# Patient Record
Sex: MALE | Race: WHITE | NOT HISPANIC OR LATINO | Employment: FULL TIME | URBAN - NONMETROPOLITAN AREA
[De-identification: names, ages, dates, MRNs, and addresses within clinical notes are randomized per-mention and may not be internally consistent; named-entity substitution may affect disease eponyms.]

---

## 2023-08-29 ENCOUNTER — OFFICE VISIT (OUTPATIENT)
Dept: CARDIOLOGY | Facility: CLINIC | Age: 38
End: 2023-08-29
Payer: COMMERCIAL

## 2023-08-29 VITALS
RESPIRATION RATE: 18 BRPM | DIASTOLIC BLOOD PRESSURE: 76 MMHG | BODY MASS INDEX: 27.4 KG/M2 | HEIGHT: 69 IN | HEART RATE: 76 BPM | OXYGEN SATURATION: 99 % | SYSTOLIC BLOOD PRESSURE: 135 MMHG | WEIGHT: 185 LBS

## 2023-08-29 DIAGNOSIS — R94.31 ABNORMAL FINDING ON EKG: ICD-10-CM

## 2023-08-29 DIAGNOSIS — Z78.9 ALCOHOL USE: ICD-10-CM

## 2023-08-29 DIAGNOSIS — Z91.89 AT RISK FOR SLEEP APNEA: ICD-10-CM

## 2023-08-29 DIAGNOSIS — Z72.0 TOBACCO USE: ICD-10-CM

## 2023-08-29 DIAGNOSIS — I10 ESSENTIAL HYPERTENSION: Primary | ICD-10-CM

## 2023-08-29 PROBLEM — F10.90 ALCOHOL USE: Status: ACTIVE | Noted: 2023-08-29

## 2023-08-29 PROBLEM — K76.0 HEPATIC STEATOSIS: Status: ACTIVE | Noted: 2023-08-29

## 2023-08-29 PROCEDURE — 93000 ELECTROCARDIOGRAM COMPLETE: CPT

## 2023-08-29 PROCEDURE — 99204 OFFICE O/P NEW MOD 45 MIN: CPT

## 2023-08-29 RX ORDER — LISINOPRIL AND HYDROCHLOROTHIAZIDE 20; 12.5 MG/1; MG/1
2 TABLET ORAL DAILY
COMMUNITY
Start: 2023-08-23

## 2023-08-29 NOTE — PROGRESS NOTES
[facility] [place]  [facility] CARDIOLOGY  [address] [place] [address]  Dept: [phone]  Dept Fax: [phone]    Tashi Rodriguez  1985    New Patient Office Note    History of Present Illness:  Tashi Rodriguez is a 38 y.o. male who presents to the clinic for Establish Care.  He has PMH significant for hepatic steatosis, B12 deficiency, HTN.  He is .  Current nightly drinker 4-12 drinks per night for 15 years, also current every day smoker half pack per day x20 years.  Caffeine drinks daily.  Family history includes mother-HTN.  Father-HTN, DM.    He presents today as referral from PCP evaluation of recently uncontrolled HTN.  He had a ED visit 7- for elevated BP greater than 200 systolic.  He states to have known diagnosis of HTN since he was in high school, he was given medication but had stopped taking, states poor compliance.  Recently he was restarted on Zestoretic 40-25 daily, BP today has improved.  He denies significant complaints at today's visit, no CP, SOB, edema, orthopnea, PND.  Recent labs reviewed revealing elevated liver enzymes, negative UDS, proteinuria, otherwise unremarkable.  ECG today SR, HR 73 bpm, left axis, nonspecific ST abnormalities, suggestive of LVH.  He did have recent visit with ENT, high risk LICO, positive snoring and witnessed apneic episodes.  He states he is unlikely to get sleep evaluation, is considering currently.  Exam today reveals normal /80 bilaterally, otherwise unremarkable.  We did discuss nutritional guidance today, advised smoking cessation, also to decrease significantly alcohol use, stop NSAIDs which he states he has been taking daily for a long time.  At this moment given his long history high blood pressure, abnormal ECG finding we will proceed with echo.    The following portions of the patient's history were reviewed and updated as appropriate: allergies, current medications, past family history, past  "medical history, past social history, past surgical history, and problem list.    Medications:  lisinopril-hydrochlorothiazide    Subjective  No Known Allergies     History reviewed. No pertinent past medical history.    History reviewed. No pertinent surgical history.    History reviewed. No pertinent family history.     Social History     Socioeconomic History    Marital status:    Tobacco Use    Smoking status: Every Day     Packs/day: 0.25     Types: Cigarettes    Smokeless tobacco: Current   Vaping Use    Vaping Use: Never used   Substance and Sexual Activity    Alcohol use: Yes    Drug use: Never    Sexual activity: Defer       Review of Systems   All other systems reviewed and are negative.    Cardiovascular Procedures    ECHO/MUGA:   STRESS TESTS:   CARDIAC CATH:   DEVICES:   HOLTER:   CT/MRI:   VASCULAR:   CARDIOTHORACIC:     Objective  Vitals:    08/29/23 1337   BP: 135/76   BP Location: Right arm   Patient Position: Lying   Cuff Size: Adult   Pulse: 76   Resp: 18   SpO2: 99%   Weight: 83.9 kg (185 lb)   Height: 175.3 cm (69\")   PainSc: 0-No pain       Physical Exam  Vitals reviewed.   Constitutional:       General: Awake.      Appearance: Normal and healthy appearance. Not in distress.   Neck:      Vascular: No JVR. JVD normal.   Pulmonary:      Effort: Pulmonary effort is normal.      Breath sounds: Normal breath sounds. No wheezing. No rhonchi. No rales.   Chest:      Chest wall: Not tender to palpatation.   Cardiovascular:      PMI at left midclavicular line. Normal rate. Regular rhythm. Normal S1. Normal S2.       Murmurs: There is no murmur.      No gallop.  No click. No rub.   Pulses:     Intact distal pulses.   Edema:     Peripheral edema absent.   Abdominal:      General: Bowel sounds are normal.      Palpations: Abdomen is soft.      Tenderness: There is no abdominal tenderness.   Musculoskeletal: Normal range of motion.         General: No tenderness. Skin:     General: Skin is " warm and dry.   Neurological:      General: No focal deficit present.      Mental Status: Alert and oriented to person, place and time.   Psychiatric:         Behavior: Behavior is cooperative.        Diagnostic Data    ECG 12 Lead    Date/Time: 8/29/2023 2:39 PM  Performed by: Nicole Arteaga APRN  Authorized by: Nicole Arteaga APRN   Comparison: compared with previous ECG from 7/10/2023  Similar to previous ECG  Rhythm: sinus rhythm  Rate: normal  BPM: 73  QRS axis: left  Other findings: non-specific ST-T wave changes and left ventricular hypertrophy    Clinical impression: abnormal EKG      Advance Care Planning        Assessment and Plan  Diagnoses and all orders for this visit:    1. Essential hypertension (Primary)  Controlled today on Zestoretic 40-25, /80 right, 126/84 left.  At this time advised low-salt, stop naproxen, smoking cessation, alcohol reduction with goal for cessation.  Continue current management this time.  We will get echo as ECG suggest LVH.  -     Adult Transthoracic Echo Complete W/ Cont if Necessary Per Protocol; Future    2. Abnormal finding on EKG  As above, nonspecific ST abnormalities with LVH.  Echo.    3. Tobacco use  Current everyday smoker half PPD x20 years.  Advised cessation.  He is not agreeable at this time.    4. Alcohol use  Current daily use 4-12 drinks at night for 15 years.  Advised to taper down for goal of cessation.    5. At risk for sleep apnea  Saw ENT Dr. Guillermo today.  Dr. Guillermo advised he has enlarged tonsils, also should have sleep study.  At this moment he is not sure if he will have.          Return in about 6 months (around 2/29/2024) for Recheck, Nicole MAYORGA.    MUKESH Guerrero  08/29/2023    Part of this note may be an electronic transcription/translation of spoken language to printed text using the Dragon Dictation System.

## 2024-01-30 ENCOUNTER — TELEPHONE (OUTPATIENT)
Dept: CARDIOLOGY | Facility: CLINIC | Age: 39
End: 2024-01-30
Payer: COMMERCIAL

## 2025-01-08 ENCOUNTER — OFFICE VISIT (OUTPATIENT)
Dept: FAMILY MEDICINE CLINIC | Facility: CLINIC | Age: 40
End: 2025-01-08
Payer: COMMERCIAL

## 2025-01-08 VITALS
DIASTOLIC BLOOD PRESSURE: 78 MMHG | HEIGHT: 69 IN | OXYGEN SATURATION: 100 % | WEIGHT: 186.2 LBS | SYSTOLIC BLOOD PRESSURE: 132 MMHG | BODY MASS INDEX: 27.58 KG/M2 | HEART RATE: 73 BPM

## 2025-01-08 DIAGNOSIS — Z11.59 NEED FOR HEPATITIS C SCREENING TEST: ICD-10-CM

## 2025-01-08 DIAGNOSIS — Z13.1 SCREENING FOR DIABETES MELLITUS: ICD-10-CM

## 2025-01-08 DIAGNOSIS — Z91.89 AT RISK FOR SLEEP APNEA: ICD-10-CM

## 2025-01-08 DIAGNOSIS — I10 ESSENTIAL HYPERTENSION: ICD-10-CM

## 2025-01-08 DIAGNOSIS — Z76.89 ENCOUNTER TO ESTABLISH CARE: Primary | ICD-10-CM

## 2025-01-08 DIAGNOSIS — K21.9 GASTROESOPHAGEAL REFLUX DISEASE, UNSPECIFIED WHETHER ESOPHAGITIS PRESENT: ICD-10-CM

## 2025-01-08 DIAGNOSIS — Z13.220 LIPID SCREENING: ICD-10-CM

## 2025-01-08 DIAGNOSIS — Z13.29 SCREENING FOR THYROID DISORDER: ICD-10-CM

## 2025-01-08 PROCEDURE — 99204 OFFICE O/P NEW MOD 45 MIN: CPT | Performed by: PHYSICIAN ASSISTANT

## 2025-01-08 RX ORDER — FAMOTIDINE 10 MG
10 TABLET ORAL 2 TIMES DAILY
COMMUNITY

## 2025-01-08 RX ORDER — LISINOPRIL AND HYDROCHLOROTHIAZIDE 12.5; 2 MG/1; MG/1
2 TABLET ORAL DAILY
Qty: 180 TABLET | Refills: 1 | Status: SHIPPED | OUTPATIENT
Start: 2025-01-08

## 2025-01-08 NOTE — PROGRESS NOTES
New Patient Office Visit      Date: 2025   Patient Name: Tashi Rodriguez  : 1985   MRN: [id number]     Chief Complaint:    Chief Complaint   Patient presents with    [facility]    Hypertension       Subjective    Patient or patient representative verbalized consent for the use of Ambient Listening during the visit with  Leticia Palmer PA-C for chart documentation. 2025  09:11 EST      History of Present Illness: Tashi Rodriguez is a 39 y.o. male who is here today to establish care.      The patient presents for evaluation of hypertension and health maintenance.  He was seen at [facility], but they are no longer taking his insurance.  He states that he had an annual physical within the last year.    He has been managing his hypertension with 2 tablets daily of lisinopril/hydrochlorothiazide 20/12.5, prescribed by his family practice physician. He believes he has one remaining refill. He does not monitor his blood pressure at home. He has a history of elevated blood pressure dating back to his late teens or early 20s, which he initially ignored until a significant spike prompted a hospital visit. He reports no associated headaches.     He has a history of heavy alcohol consumption for approximately 15 to 16 years but has since ceased drinking. He also has a history of heavy smoking but currently only uses chewing tobacco. He maintains a balanced diet, although he admits to frequent fast food consumption and a preference for red meat. He does not engage in formal exercise but remains active. He has not had recent blood work done. His last annual physical was conducted in , but he is unsure exactly when.    He has consulted with Dr. Shane but has not undergone a sleep study due to personal discomfort with the doctor. He reports poor sleep quality and his wife has observed episodes of breathing difficulties during his sleep, suggesting possible sleep apnea. He has  experienced episodes of waking up gasping for air in the past, but these have not occurred recently. He expresses reluctance towards using a CPAP machine. He typically wakes up around 4:30 AM on workdays and 3:00 AM on his days off. He often dozes off in the evenings before bedtime but remains motivated and active during the day. He has not experienced any weight changes since puberty.    He has a history of gingivitis and requires dental check-ups every 3 months. He brushes his teeth twice daily.    He experiences excessive sweating in his feet but reports no associated complications. He has been advised to keep his feet dry by using powder in his socks and shoes. He prefers wearing boots and dislikes being barefoot. He ensures his feet are clean and dry after work. He has used foot powder in the past but not currently.    SOCIAL HISTORY  The patient has a history of heavy alcohol consumption for approximately 15 to 16 years but has since ceased drinking. He also has a history of heavy smoking but currently only uses chewing tobacco. He works 6 days a week.    IMMUNIZATIONS  The patient has not received an influenza vaccine and is unsure about his tetanus vaccination status.      Review of Systems:   Review of Systems   Constitutional:  Negative for activity change, appetite change, fatigue, fever, unexpected weight gain and unexpected weight loss.   HENT:  Positive for dental problem (Gingivitis, sees dentist every 3 months). Negative for congestion, ear discharge, ear pain, mouth sores, postnasal drip, rhinorrhea, sinus pressure, sneezing, sore throat, trouble swallowing and voice change.    Eyes:  Negative for blurred vision, double vision, photophobia, pain, itching and visual disturbance.   Respiratory:  Negative for apnea, cough, chest tightness, shortness of breath and wheezing.    Cardiovascular:  Negative for chest pain, palpitations and leg swelling.   Gastrointestinal:  Positive for GERD (Managed with  famotidine OTC). Negative for abdominal pain, blood in stool, constipation, diarrhea, nausea and vomiting.   Endocrine: Negative for cold intolerance, heat intolerance, polydipsia and polyuria.   Genitourinary:  Negative for decreased urine volume, difficulty urinating, dysuria, flank pain, frequency, hematuria and urinary incontinence.   Musculoskeletal:  Negative for arthralgias, back pain, gait problem, joint swelling and myalgias.   Skin:  Negative for rash, skin lesions and wound.   Allergic/Immunologic: Negative for environmental allergies and food allergies.   Neurological:  Negative for dizziness, syncope, weakness, light-headedness, numbness and headache.   Hematological:  Negative for adenopathy. Does not bruise/bleed easily.   Psychiatric/Behavioral:  Positive for sleep disturbance. Negative for decreased concentration, dysphoric mood, depressed mood and stress. The patient is not nervous/anxious.        Past Medical History:   Past Medical History:   Diagnosis Date    Anemia     Hypertension        Past Surgical History: History reviewed. No pertinent surgical history.    Family History:   Family History   Problem Relation Age of Onset    Arthritis Mother     Hypertension Mother     Diabetes Father     Heart disease Father         pacemaker    Diabetes Maternal Grandfather     Heart attack Maternal Grandfather     Heart disease Paternal Uncle        Social History:   Social History     Socioeconomic History    Marital status:    Tobacco Use    Smoking status: Former     Current packs/day: 0.25     Average packs/day: 0.3 packs/day for 15.0 years (3.8 ttl pk-yrs)     Types: Cigarettes    Smokeless tobacco: Current     Types: Chew   Vaping Use    Vaping status: Never Used   Substance and Sexual Activity    Alcohol use: Not Currently    Drug use: Never    Sexual activity: Not Currently     Partners: Female       Medications:     Current Outpatient Medications:     lisinopril-hydrochlorothiazide  "(PRINZIDE,ZESTORETIC) 20-12.5 MG per tablet, Take 2 tablets by mouth Daily., Disp: 180 tablet, Rfl: 1    famotidine (PEPCID) 10 MG tablet, Take 1 tablet by mouth 2 (Two) Times a Day. OTC, Disp: , Rfl:     Allergies:   No Known Allergies    Objective     Physical Exam:  Vital Signs:   Vitals:    01/08/25 0833 01/08/25 0957   BP: 142/86 132/78   BP Location: Right arm Left arm   Patient Position: Sitting Sitting   Cuff Size: Adult    Pulse: 73    SpO2: 100%    Weight: 84.5 kg (186 lb 3.2 oz)    Height: 175.3 cm (69\")      Body mass index is 27.5 kg/m².       Physical Exam  Vitals and nursing note reviewed.   Constitutional:       General: He is not in acute distress.     Appearance: Normal appearance.   HENT:      Head: Normocephalic and atraumatic.      Right Ear: Tympanic membrane, ear canal and external ear normal.      Left Ear: Tympanic membrane, ear canal and external ear normal.      Nose: Nose normal.      Mouth/Throat:      Mouth: Mucous membranes are moist.      Pharynx: Oropharynx is clear.   Eyes:      Extraocular Movements: Extraocular movements intact.      Conjunctiva/sclera: Conjunctivae normal.      Pupils: Pupils are equal, round, and reactive to light.   Cardiovascular:      Rate and Rhythm: Normal rate and regular rhythm.      Pulses: Normal pulses.      Heart sounds: Normal heart sounds.   Pulmonary:      Effort: Pulmonary effort is normal.      Breath sounds: Normal breath sounds.   Abdominal:      General: Bowel sounds are normal.      Palpations: Abdomen is soft.   Musculoskeletal:      Cervical back: Normal range of motion and neck supple.      Right lower leg: No edema.      Left lower leg: No edema.   Lymphadenopathy:      Cervical: No cervical adenopathy.   Skin:     General: Skin is warm.   Neurological:      General: No focal deficit present.      Mental Status: He is alert and oriented to person, place, and time.      Cranial Nerves: No cranial nerve deficit.      Motor: No weakness.     "  Coordination: Coordination normal.      Gait: Gait normal.   Psychiatric:         Mood and Affect: Mood normal.         Behavior: Behavior normal.         Results:   PHQ-2 Depression Screening  Little interest or pleasure in doing things? Not at all   Feeling down, depressed, or hopeless? Not at all   PHQ-2 Total Score 0        Labs:    No results found for this or any previous visit (from the past 24 hours).       Assessment / Plan      Assessment/Plan:     Diagnoses and all orders for this visit:    1. Encounter to establish care (Primary)  Comments:  Records will be requested from previous provider.  Orders:  -     HCV Antibody Rfx To Qnt PCR; Future  -     Thyroid Cascade Profile; Future  -     Lipid Panel; Future  -     Hemoglobin A1c; Future  -     Comprehensive Metabolic Panel; Future  -     CBC & Differential  -     Comprehensive Metabolic Panel  -     Hemoglobin A1c  -     Lipid Panel  -     Thyroid Cascade Profile  -     HCV Antibody Rfx To Qnt PCR    2. Essential hypertension  Assessment & Plan:  Hypertension is chronic and stable on current therapy.  He will continue medications as previously prescribed.    Orders:  -     Comprehensive Metabolic Panel; Future  -     CBC & Differential  -     lisinopril-hydrochlorothiazide (PRINZIDE,ZESTORETIC) 20-12.5 MG per tablet; Take 2 tablets by mouth Daily.  Dispense: 180 tablet; Refill: 1  -     Comprehensive Metabolic Panel    3. At risk for sleep apnea  Assessment & Plan:  He reports poor sleep quality and his wife has observed episodes of breathing difficulties during his sleep, suggesting possible sleep apnea. He has been informed about the potential link between sleep apnea and hypertension. He is advised to consider a consultation with a sleep specialist, but he declines.  If he decides to pursue this, he will inform us so that a referral can be arranged.      4. Gastroesophageal reflux disease, unspecified whether esophagitis present  Assessment &  Plan:  He experiences heartburn and acid reflux, which are managed with daily famotidine OTC.      5. Screening for diabetes mellitus  -     Hemoglobin A1c; Future  -     Hemoglobin A1c    6. Lipid screening  -     Lipid Panel; Future  -     Lipid Panel    7. Screening for thyroid disorder  -     Thyroid Cascade Profile; Future  -     Thyroid Dillon Profile    8. Need for hepatitis C screening test  -     HCV Antibody Rfx To Qnt PCR; Future  -     HCV Antibody Rfx To Qnt PCR           Health Maintenance: Discussed injury prevention, diet and exercise, safe sexual practices, and screening for common diseases. Encouraged use of sunscreen and seatbelts. Discussed timing of colon cancer cancer screening, prostate cancer screening, and review of skin for lesions. Avoidance of tobacco encouraged. Limitation or avoidance of alcohol encouraged. Recommend yearly dental and eye exams. Also discussed monitoring of blood pressure and lipids.       Follow Up:   Return in about 6 months (around 7/8/2025) for Annual Physical.    Leticia Palmer PA-C  [facility] Internal Medicine [facility]

## 2025-01-08 NOTE — ASSESSMENT & PLAN NOTE
He reports poor sleep quality and his wife has observed episodes of breathing difficulties during his sleep, suggesting possible sleep apnea. He has been informed about the potential link between sleep apnea and hypertension. He is advised to consider a consultation with a sleep specialist, but he declines.  If he decides to pursue this, he will inform us so that a referral can be arranged.

## 2025-01-09 LAB
ALBUMIN SERPL-MCNC: 5 G/DL (ref 4.1–5.1)
ALP SERPL-CCNC: 70 IU/L (ref 44–121)
ALT SERPL-CCNC: 37 IU/L (ref 0–44)
AST SERPL-CCNC: 28 IU/L (ref 0–40)
BASOPHILS # BLD AUTO: 0.1 X10E3/UL (ref 0–0.2)
BASOPHILS NFR BLD AUTO: 1 %
BILIRUB SERPL-MCNC: 0.5 MG/DL (ref 0–1.2)
BUN SERPL-MCNC: 13 MG/DL (ref 6–20)
BUN/CREAT SERPL: 14 (ref 9–20)
CALCIUM SERPL-MCNC: 9.9 MG/DL (ref 8.7–10.2)
CHLORIDE SERPL-SCNC: 97 MMOL/L (ref 96–106)
CHOLEST SERPL-MCNC: 253 MG/DL (ref 100–199)
CO2 SERPL-SCNC: 24 MMOL/L (ref 20–29)
CREAT SERPL-MCNC: 0.92 MG/DL (ref 0.76–1.27)
EGFRCR SERPLBLD CKD-EPI 2021: 109 ML/MIN/1.73
EOSINOPHIL # BLD AUTO: 0 X10E3/UL (ref 0–0.4)
EOSINOPHIL NFR BLD AUTO: 1 %
ERYTHROCYTE [DISTWIDTH] IN BLOOD BY AUTOMATED COUNT: 12.8 % (ref 11.6–15.4)
GLOBULIN SER CALC-MCNC: 2.5 G/DL (ref 1.5–4.5)
GLUCOSE SERPL-MCNC: 69 MG/DL (ref 70–99)
HBA1C MFR BLD: 5.7 % (ref 4.8–5.6)
HCT VFR BLD AUTO: 45.7 % (ref 37.5–51)
HCV AB SERPL QL IA: NORMAL
HCV IGG SERPL QL IA: NON REACTIVE
HDLC SERPL-MCNC: 49 MG/DL
HGB BLD-MCNC: 15 G/DL (ref 13–17.7)
IMM GRANULOCYTES # BLD AUTO: 0 X10E3/UL (ref 0–0.1)
IMM GRANULOCYTES NFR BLD AUTO: 0 %
LDLC SERPL CALC-MCNC: 167 MG/DL (ref 0–99)
LYMPHOCYTES # BLD AUTO: 1.2 X10E3/UL (ref 0.7–3.1)
LYMPHOCYTES NFR BLD AUTO: 15 %
MCH RBC QN AUTO: 31 PG (ref 26.6–33)
MCHC RBC AUTO-ENTMCNC: 32.8 G/DL (ref 31.5–35.7)
MCV RBC AUTO: 94 FL (ref 79–97)
MONOCYTES # BLD AUTO: 0.7 X10E3/UL (ref 0.1–0.9)
MONOCYTES NFR BLD AUTO: 10 %
NEUTROPHILS # BLD AUTO: 5.5 X10E3/UL (ref 1.4–7)
NEUTROPHILS NFR BLD AUTO: 73 %
PLATELET # BLD AUTO: 275 X10E3/UL (ref 150–450)
POTASSIUM SERPL-SCNC: 4.7 MMOL/L (ref 3.5–5.2)
PROT SERPL-MCNC: 7.5 G/DL (ref 6–8.5)
RBC # BLD AUTO: 4.84 X10E6/UL (ref 4.14–5.8)
SODIUM SERPL-SCNC: 135 MMOL/L (ref 134–144)
TRIGL SERPL-MCNC: 198 MG/DL (ref 0–149)
TSH SERPL DL<=0.005 MIU/L-ACNC: 1.23 UIU/ML (ref 0.45–4.5)
VLDLC SERPL CALC-MCNC: 37 MG/DL (ref 5–40)
WBC # BLD AUTO: 7.5 X10E3/UL (ref 3.4–10.8)

## 2025-07-09 ENCOUNTER — OFFICE VISIT (OUTPATIENT)
Dept: FAMILY MEDICINE CLINIC | Facility: CLINIC | Age: 40
End: 2025-07-09
Payer: COMMERCIAL

## 2025-07-09 VITALS
HEIGHT: 69 IN | WEIGHT: 188.6 LBS | DIASTOLIC BLOOD PRESSURE: 78 MMHG | BODY MASS INDEX: 27.93 KG/M2 | SYSTOLIC BLOOD PRESSURE: 108 MMHG | OXYGEN SATURATION: 96 % | HEART RATE: 81 BPM

## 2025-07-09 DIAGNOSIS — E78.2 MIXED HYPERLIPIDEMIA: ICD-10-CM

## 2025-07-09 DIAGNOSIS — M10.9 GOUT OF LEFT FOOT, UNSPECIFIED CAUSE, UNSPECIFIED CHRONICITY: ICD-10-CM

## 2025-07-09 DIAGNOSIS — K21.9 GASTROESOPHAGEAL REFLUX DISEASE, UNSPECIFIED WHETHER ESOPHAGITIS PRESENT: ICD-10-CM

## 2025-07-09 DIAGNOSIS — Z00.00 GENERAL MEDICAL EXAM: Primary | ICD-10-CM

## 2025-07-09 DIAGNOSIS — I10 ESSENTIAL HYPERTENSION: ICD-10-CM

## 2025-07-09 RX ORDER — VITAMIN B COMPLEX
1 CAPSULE ORAL EVERY 24 HOURS
COMMUNITY

## 2025-07-09 RX ORDER — LISINOPRIL AND HYDROCHLOROTHIAZIDE 12.5; 2 MG/1; MG/1
2 TABLET ORAL DAILY
Qty: 180 TABLET | Refills: 1 | Status: SHIPPED | OUTPATIENT
Start: 2025-07-09

## 2025-07-09 NOTE — PROGRESS NOTES
Male Physical Note      Date: 2025   Patient Name: Tashi Rodriguez  : 1985   MRN: [id number]     Chief Complaint:    Chief Complaint   Patient presents with    Annual Exam       History of Present Illness: Tashi Rodriguez is a 40 y.o. male who is here today for their annual health maintenance and physical.       The patient presents for evaluation of blood pressure, cholesterol, gout, and heartburn.    He has not been monitoring his blood pressure at home but reports no symptoms of lightheadedness or headaches. His diet remains unchanged, with a preference for bread, noodles, cheese, red meat, fish, and rice. He does not engage in regular exercise but maintains an active lifestyle due to his job, which involves being on his feet all day. He has declined the tetanus vaccine.    In early , he experienced a sharp shooting pain in his left foot, diagnosed as gout. The pain was severe, causing difficulty walking and putting on his shoe. A 3-day course of medication alleviated the symptoms. He has had a few flare-ups since then, particularly during cold weather, though none as severe as the initial episode. He seeks advice on managing future flare-ups.    He reports no changes in bowel movements, constipation, or abdominal pain. He manages heartburn and reflux with over-the-counter famotidine and occasionally takes Tums when consuming certain foods like pork rinds.    He has not undergone a sleep study but reports no current sleep issues. He describes himself as a light sleeper, typically waking up at 3:00 AM, and does not feel excessively tired during the day.    FAMILY HISTORY  - Father had gout, particularly in his elbow         Subjective      Review of Systems:   Review of Systems   Constitutional:  Negative for activity change, appetite change, fatigue, fever, unexpected weight gain and unexpected weight loss.   HENT:  Negative for congestion, ear discharge, ear pain, postnasal drip,  rhinorrhea, sinus pressure, sneezing, sore throat, trouble swallowing and voice change.    Eyes:  Negative for blurred vision, double vision, photophobia, pain, itching and visual disturbance.   Respiratory:  Negative for apnea, cough, chest tightness, shortness of breath and wheezing.    Cardiovascular:  Negative for chest pain, palpitations and leg swelling.   Gastrointestinal:  Negative for abdominal pain, blood in stool, constipation, diarrhea, nausea, vomiting and GERD.   Endocrine: Negative for cold intolerance, heat intolerance, polydipsia and polyuria.   Genitourinary:  Negative for decreased urine volume, difficulty urinating, dysuria, flank pain, frequency, hematuria and urinary incontinence.   Musculoskeletal:  Negative for arthralgias, back pain, gait problem, joint swelling and myalgias.   Skin:  Negative for rash, skin lesions and wound.   Allergic/Immunologic: Negative for environmental allergies and food allergies.   Neurological:  Negative for dizziness, syncope, weakness, light-headedness, numbness and headache.   Hematological:  Negative for adenopathy. Does not bruise/bleed easily.   Psychiatric/Behavioral:  Negative for decreased concentration, dysphoric mood, sleep disturbance, depressed mood and stress. The patient is not nervous/anxious.        Past Medical History, Social History, Family History and Care Team were all reviewed with patient and updated as appropriate.     Medications:     Current Outpatient Medications:     B Complex Vitamins (Vitamin B Complex) capsule, 1 capsule Daily., Disp: , Rfl:     famotidine (PEPCID) 10 MG tablet, Take 1 tablet by mouth 2 (Two) Times a Day. OTC, Disp: , Rfl:     lisinopril-hydrochlorothiazide (PRINZIDE,ZESTORETIC) 20-12.5 MG per tablet, Take 2 tablets by mouth Daily., Disp: 180 tablet, Rfl: 1    Allergies:   No Known Allergies    Health Maintenance   Topic Date Due    ANNUAL PHYSICAL  Never done    COVID-19 Vaccine (1 - 2024-25 season) 01/01/2026  "(Originally 9/1/2024)    Pneumococcal Vaccine 0-49 (1 of 2 - PCV) 01/05/2026 (Originally 6/4/2004)    TDAP/TD VACCINES (1 - Tdap) 01/05/2026 (Originally 6/4/2004)    INFLUENZA VACCINE  10/01/2025    LIPID PANEL  01/08/2026    HEPATITIS C SCREENING  Completed        Objective     Physical Exam:  Vital Signs:   Vitals:    07/09/25 0808   BP: 108/78   Pulse: 81   SpO2: 96%   Weight: 85.5 kg (188 lb 9.6 oz)   Height: 175.3 cm (69\")     Body mass index is 27.85 kg/m².     Physical Exam  Vitals and nursing note reviewed.   Constitutional:       General: He is not in acute distress.     Appearance: Normal appearance.   HENT:      Head: Normocephalic and atraumatic.      Right Ear: Tympanic membrane, ear canal and external ear normal.      Left Ear: Tympanic membrane, ear canal and external ear normal.      Nose: Nose normal.      Mouth/Throat:      Mouth: Mucous membranes are moist.      Pharynx: Oropharynx is clear.   Eyes:      Extraocular Movements: Extraocular movements intact.      Conjunctiva/sclera: Conjunctivae normal.      Pupils: Pupils are equal, round, and reactive to light.   Cardiovascular:      Rate and Rhythm: Normal rate and regular rhythm.      Pulses: Normal pulses.      Heart sounds: Normal heart sounds.   Pulmonary:      Effort: Pulmonary effort is normal.      Breath sounds: Normal breath sounds.   Abdominal:      General: Bowel sounds are normal.      Palpations: Abdomen is soft.   Musculoskeletal:      Cervical back: Normal range of motion and neck supple.      Right lower leg: No edema.      Left lower leg: No edema.   Lymphadenopathy:      Cervical: No cervical adenopathy.   Skin:     General: Skin is warm.   Neurological:      General: No focal deficit present.      Mental Status: He is alert and oriented to person, place, and time.      Cranial Nerves: No cranial nerve deficit.      Motor: No weakness.      Coordination: Coordination normal.      Gait: Gait normal.   Psychiatric:         Mood " and Affect: Mood normal.         Behavior: Behavior normal.         Assessment / Plan      Assessment/Plan:   Diagnoses and all orders for this visit:    1. General medical exam (Primary)  Comments:  Benefits of immunizations and preventative screenings discussed with the patient and encouraged.    2. Essential hypertension  Assessment & Plan:  Hypertension is chronic and stable on current therapy.  He will continue medications as previously prescribed.    Orders:  -     Cancel: CBC & Differential  -     Cancel: Comprehensive Metabolic Panel  -     lisinopril-hydrochlorothiazide (PRINZIDE,ZESTORETIC) 20-12.5 MG per tablet; Take 2 tablets by mouth Daily.  Dispense: 180 tablet; Refill: 1  -     CBC & Differential  -     Comprehensive Metabolic Panel    3. Mixed hyperlipidemia  Assessment & Plan:  - Levels elevated during last lab check.  - Encourage regular exercise to manage cholesterol.  - Recheck blood work today.  - Advise dietary intake monitoring, particularly fats and red meat.    Orders:  -     Cancel: CBC & Differential  -     Cancel: Comprehensive Metabolic Panel  -     Cancel: Lipid Panel  -     CBC & Differential  -     Comprehensive Metabolic Panel  -     Lipid Panel    4. Gout of left foot, unspecified cause, unspecified chronicity  Assessment & Plan:  - Previous diagnosis with significant pain in left foot.  - Recommend dietary modifications to reduce red meat and fried foods.  - Add uric acid level test to today's lab work to assess need for medication.  - Discuss possibility of medication if uric acid levels are high and multiple flare-ups occur.    Orders:  -     CBC & Differential  -     Comprehensive Metabolic Panel  -     Uric Acid    5. Gastroesophageal reflux disease, unspecified whether esophagitis present  Assessment & Plan:  He experiences heartburn and acid reflux, which are managed with daily famotidine OTC.          Follow Up:   Return in about 6 months (around 1/9/2026) for next scheduled  follow up.    Healthcare Maintenance:   Discussed injury prevention, diet and exercise, safe sexual practices, and screening for common diseases. Encouraged use of sunscreen and seatbelts. Discussed timing of colon cancer cancer screening, prostate cancer screening, and review of skin for lesions. Avoidance of tobacco encouraged. Limitation or avoidance of alcohol encouraged. Recommend yearly dental and eye exams. Also discussed monitoring of blood pressure and lipids.   Tashi Rodriguez voices understanding and acceptance of this advice and will call back with any further questions or concerns. AVS with preventive healthcare tips printed for patient.     Leticia Palmer PA-C  [facility]    Patient or patient representative verbalized consent for the use of Ambient Listening during the visit with  Leticia Palmer PA-C for chart documentation. 07/09/2025

## 2025-07-09 NOTE — ASSESSMENT & PLAN NOTE
- Previous diagnosis with significant pain in left foot.  - Recommend dietary modifications to reduce red meat and fried foods.  - Add uric acid level test to today's lab work to assess need for medication.  - Discuss possibility of medication if uric acid levels are high and multiple flare-ups occur.

## 2025-07-10 LAB
ALBUMIN SERPL-MCNC: 4.6 G/DL (ref 3.5–5.2)
ALBUMIN/GLOB SERPL: 1.8 G/DL
ALP SERPL-CCNC: 66 U/L (ref 39–117)
ALT SERPL-CCNC: 40 U/L (ref 1–41)
AST SERPL-CCNC: 26 U/L (ref 1–40)
BASOPHILS # BLD AUTO: 0.03 10*3/MM3 (ref 0–0.2)
BASOPHILS NFR BLD AUTO: 0.5 % (ref 0–1.5)
BILIRUB SERPL-MCNC: 0.3 MG/DL (ref 0–1.2)
BUN SERPL-MCNC: 12 MG/DL (ref 6–20)
BUN/CREAT SERPL: 12.6 (ref 7–25)
CALCIUM SERPL-MCNC: 9.5 MG/DL (ref 8.6–10.5)
CHLORIDE SERPL-SCNC: 101 MMOL/L (ref 98–107)
CHOLEST SERPL-MCNC: 212 MG/DL (ref 0–200)
CO2 SERPL-SCNC: 24.9 MMOL/L (ref 22–29)
CREAT SERPL-MCNC: 0.95 MG/DL (ref 0.76–1.27)
EGFRCR SERPLBLD CKD-EPI 2021: 103.8 ML/MIN/1.73
EOSINOPHIL # BLD AUTO: 0.04 10*3/MM3 (ref 0–0.4)
EOSINOPHIL NFR BLD AUTO: 0.7 % (ref 0.3–6.2)
ERYTHROCYTE [DISTWIDTH] IN BLOOD BY AUTOMATED COUNT: 13.6 % (ref 12.3–15.4)
GLOBULIN SER CALC-MCNC: 2.6 GM/DL
GLUCOSE SERPL-MCNC: 93 MG/DL (ref 65–99)
HCT VFR BLD AUTO: 45 % (ref 37.5–51)
HDLC SERPL-MCNC: 38 MG/DL (ref 40–60)
HGB BLD-MCNC: 14.4 G/DL (ref 13–17.7)
IMM GRANULOCYTES # BLD AUTO: 0.03 10*3/MM3 (ref 0–0.05)
IMM GRANULOCYTES NFR BLD AUTO: 0.5 % (ref 0–0.5)
LDLC SERPL CALC-MCNC: 124 MG/DL (ref 0–100)
LYMPHOCYTES # BLD AUTO: 1.17 10*3/MM3 (ref 0.7–3.1)
LYMPHOCYTES NFR BLD AUTO: 19.2 % (ref 19.6–45.3)
MCH RBC QN AUTO: 30.8 PG (ref 26.6–33)
MCHC RBC AUTO-ENTMCNC: 32 G/DL (ref 31.5–35.7)
MCV RBC AUTO: 96.4 FL (ref 79–97)
MONOCYTES # BLD AUTO: 0.45 10*3/MM3 (ref 0.1–0.9)
MONOCYTES NFR BLD AUTO: 7.4 % (ref 5–12)
NEUTROPHILS # BLD AUTO: 4.38 10*3/MM3 (ref 1.7–7)
NEUTROPHILS NFR BLD AUTO: 71.7 % (ref 42.7–76)
NRBC BLD AUTO-RTO: 0 /100 WBC (ref 0–0.2)
PLATELET # BLD AUTO: 242 10*3/MM3 (ref 140–450)
POTASSIUM SERPL-SCNC: 4.4 MMOL/L (ref 3.5–5.2)
PROT SERPL-MCNC: 7.2 G/DL (ref 6–8.5)
RBC # BLD AUTO: 4.67 10*6/MM3 (ref 4.14–5.8)
SODIUM SERPL-SCNC: 138 MMOL/L (ref 136–145)
TRIGL SERPL-MCNC: 280 MG/DL (ref 0–150)
URATE SERPL-MCNC: 7.3 MG/DL (ref 3.4–7)
VLDLC SERPL CALC-MCNC: 50 MG/DL (ref 5–40)
WBC # BLD AUTO: 6.1 10*3/MM3 (ref 3.4–10.8)